# Patient Record
Sex: FEMALE | Race: OTHER | URBAN - METROPOLITAN AREA
[De-identification: names, ages, dates, MRNs, and addresses within clinical notes are randomized per-mention and may not be internally consistent; named-entity substitution may affect disease eponyms.]

---

## 2018-08-15 NOTE — PATIENT DISCUSSION
S/P PE IOL, _OS__. DOING WELL. CONTINUE PRED-GATI-BROM IN THE SURGICAL EYE  FOR A TOTAL OF 3 WEEKS USE THEN DISCONTINUE. SCHEDULE 2ND EYE CATARACT SURGERY.

## 2018-08-15 NOTE — PATIENT DISCUSSION
Pre-Op 2nd Eye Counseling: The patient has noticed an improvement in their visual symptoms in the operative eye. The patient complains of decreased vision in the fellow eye when _____READING SMALL PRINT___________. It was explained to the patient that the decision to proceed with cataract surgery in the fellow eye is entirely a separate decision from the surgical eye. All of the same risks, benefits and alternatives are reviewed with the patient again. The patient does feel the vision in the non-operative eye is limiting their daily activities and elects to proceed with cataract surgery in the __RIGHT_____ eye. . I have given the patient the prescribed regimen of  drops to use before and after cataract surgery. Patient to administer as directed.

## 2019-03-05 NOTE — PATIENT DISCUSSION
Continue: ICaps (ydfr-w6-y-e-lutein-zeaxant-min): tablet extended release: 3,156-5-204-75 unit-mg-mg-unit

## 2020-03-04 NOTE — PATIENT DISCUSSION
Continue: ICaps (celk-b5-b-e-lutein-zeaxant-min): tablet extended release: 3,357-4-364-75 unit-mg-mg-unit

## 2021-03-29 NOTE — PATIENT DISCUSSION
Continue: ICaps (gmnj-h4-i-e-lutein-zeaxant-min): tablet extended release: [phone] unit-mg-mg-unit

## 2022-08-22 ENCOUNTER — COMPREHENSIVE EXAM (OUTPATIENT)
Dept: URBAN - METROPOLITAN AREA CLINIC 24 | Facility: CLINIC | Age: 57
End: 2022-08-22

## 2022-08-22 DIAGNOSIS — H52.4: ICD-10-CM

## 2022-08-22 DIAGNOSIS — H52.13: ICD-10-CM

## 2022-08-22 PROCEDURE — 92015 DETERMINE REFRACTIVE STATE: CPT

## 2022-08-22 PROCEDURE — 92014 COMPRE OPH EXAM EST PT 1/>: CPT

## 2022-08-22 ASSESSMENT — TONOMETRY
OD_IOP_MMHG: 20
OS_IOP_MMHG: 17

## 2022-08-22 ASSESSMENT — VISUAL ACUITY
OS_CC: 20/25-3
OU_CC: 20/25-2
OU_CC: 20/25-1
OD_CC: 20/25-2
